# Patient Record
Sex: MALE | Race: WHITE | NOT HISPANIC OR LATINO | Employment: FULL TIME | URBAN - NONMETROPOLITAN AREA
[De-identification: names, ages, dates, MRNs, and addresses within clinical notes are randomized per-mention and may not be internally consistent; named-entity substitution may affect disease eponyms.]

---

## 2022-06-15 ENCOUNTER — OFFICE VISIT (OUTPATIENT)
Dept: URGENT CARE | Facility: CLINIC | Age: 37
End: 2022-06-15

## 2022-06-15 VITALS
RESPIRATION RATE: 16 BRPM | OXYGEN SATURATION: 97 % | SYSTOLIC BLOOD PRESSURE: 110 MMHG | DIASTOLIC BLOOD PRESSURE: 64 MMHG | BODY MASS INDEX: 22.87 KG/M2 | WEIGHT: 183.9 LBS | HEART RATE: 89 BPM | HEIGHT: 75 IN | TEMPERATURE: 98.6 F

## 2022-06-15 DIAGNOSIS — J02.0 PHARYNGITIS DUE TO STREPTOCOCCUS SPECIES: ICD-10-CM

## 2022-06-15 LAB
INT CON NEG: ABNORMAL
INT CON POS: ABNORMAL
S PYO AG THROAT QL: POSITIVE

## 2022-06-15 PROCEDURE — 87880 STREP A ASSAY W/OPTIC: CPT | Performed by: NURSE PRACTITIONER

## 2022-06-15 PROCEDURE — 99203 OFFICE O/P NEW LOW 30 MIN: CPT | Performed by: NURSE PRACTITIONER

## 2022-06-15 RX ORDER — DEXAMETHASONE SODIUM PHOSPHATE 10 MG/ML
10 INJECTION INTRAMUSCULAR; INTRAVENOUS ONCE
Status: COMPLETED | OUTPATIENT
Start: 2022-06-15 | End: 2022-06-15

## 2022-06-15 RX ORDER — AMOXICILLIN 500 MG/1
500 CAPSULE ORAL 2 TIMES DAILY
Qty: 20 CAPSULE | Refills: 0 | Status: SHIPPED | OUTPATIENT
Start: 2022-06-15 | End: 2022-06-25

## 2022-06-15 RX ORDER — IBUPROFEN 600 MG/1
600 TABLET ORAL EVERY 6 HOURS PRN
Qty: 30 TABLET | Refills: 0 | Status: SHIPPED | OUTPATIENT
Start: 2022-06-15

## 2022-06-15 RX ADMIN — DEXAMETHASONE SODIUM PHOSPHATE 10 MG: 10 INJECTION INTRAMUSCULAR; INTRAVENOUS at 11:37

## 2022-06-15 ASSESSMENT — ENCOUNTER SYMPTOMS
NAUSEA: 1
SORE THROAT: 1
WHEEZING: 0
COUGH: 0
DIARRHEA: 0
SWOLLEN GLANDS: 1
HEADACHES: 0
VOMITING: 1
FEVER: 1
SHORTNESS OF BREATH: 0

## 2022-06-15 NOTE — PATIENT INSTRUCTIONS
-Take antibiotic as directed.  -Oral Hydration.  -Warm salt water gargles.  -OTC Throat lozenges or spray (Cepacol).  -Tylenol and Motrin as directed for pain and fever.  -Hand Hygiene: Wash hands frequently with soap and water.  -Throw away toothbrush after 24 hrs on antibiotics, replace with new one.    Follow up for persistent throat pain, increased swelling, persistent fevers, difficulty swallowing, shortness of breath, weakness, elevated heart rate, or any other concerns.

## 2022-06-15 NOTE — PROGRESS NOTES
Subjective:     Hector Mera is a 37 y.o. male who presents for Pharyngitis (X 3 days; swollen lymph nodes, fever )      Symptoms started Sunday night. Temp of 38. Pain with swallowing. Has had multiple negative home COVID tests.     Pharyngitis   This is a new problem. The current episode started in the past 7 days. The problem has been gradually worsening. Neither side of throat is experiencing more pain than the other. The maximum temperature recorded prior to his arrival was 100.4 - 100.9 F. The pain is at a severity of 7/10. The pain is moderate. Associated symptoms include swollen glands and vomiting. Pertinent negatives include no coughing, diarrhea, drooling, ear pain, headaches or shortness of breath. He has tried NSAIDs for the symptoms.       History reviewed. No pertinent past medical history.    History reviewed. No pertinent surgical history.    Social History     Socioeconomic History   • Marital status:      Spouse name: Not on file   • Number of children: Not on file   • Years of education: Not on file   • Highest education level: Not on file   Occupational History   • Not on file   Tobacco Use   • Smoking status: Light Tobacco Smoker   • Smokeless tobacco: Never Used   Vaping Use   • Vaping Use: Never used   Substance and Sexual Activity   • Alcohol use: Yes   • Drug use: Never   • Sexual activity: Not on file   Other Topics Concern   • Not on file   Social History Narrative   • Not on file     Social Determinants of Health     Financial Resource Strain: Not on file   Food Insecurity: Not on file   Transportation Needs: Not on file   Physical Activity: Not on file   Stress: Not on file   Social Connections: Not on file   Intimate Partner Violence: Not on file   Housing Stability: Not on file        History reviewed. No pertinent family history.     Allergies   Allergen Reactions   • Clarithromycin        Review of Systems   Constitutional: Positive for fever and malaise/fatigue.  "  HENT: Positive for sore throat. Negative for drooling and ear pain.    Respiratory: Negative for cough, shortness of breath and wheezing.    Gastrointestinal: Positive for nausea and vomiting. Negative for diarrhea.   Neurological: Negative for headaches.   All other systems reviewed and are negative.       Objective:   /64 (BP Location: Right arm, Patient Position: Sitting)   Pulse 89   Temp 37 °C (98.6 °F) (Temporal)   Resp 16   Ht 1.905 m (6' 3\")   Wt 83.4 kg (183 lb 14.4 oz)   SpO2 97%   BMI 22.99 kg/m²     Physical Exam  Vitals reviewed.   Constitutional:       General: He is not in acute distress.     Appearance: He is well-developed. He is ill-appearing. He is not toxic-appearing.   HENT:      Head: Normocephalic and atraumatic.      Right Ear: Tympanic membrane, ear canal and external ear normal.      Left Ear: Tympanic membrane, ear canal and external ear normal.      Nose: Mucosal edema present.      Mouth/Throat:      Lips: Pink.      Mouth: Mucous membranes are moist.      Pharynx: Uvula midline. Posterior oropharyngeal erythema present.      Tonsils: Tonsillar exudate present. 3+ on the right. 3+ on the left.      Comments: Slightly muffled voice.   Eyes:      Conjunctiva/sclera: Conjunctivae normal.   Cardiovascular:      Rate and Rhythm: Normal rate.   Pulmonary:      Effort: Pulmonary effort is normal. No respiratory distress.      Breath sounds: Normal breath sounds. No stridor. No wheezing, rhonchi or rales.   Musculoskeletal:         General: Normal range of motion.      Cervical back: Normal range of motion and neck supple.   Lymphadenopathy:      Head:      Right side of head: Tonsillar adenopathy present.      Left side of head: Tonsillar adenopathy present.   Skin:     General: Skin is warm and dry.      Findings: No rash.   Neurological:      General: No focal deficit present.      Mental Status: He is alert and oriented to person, place, and time.      GCS: GCS eye subscore is " 4. GCS verbal subscore is 5. GCS motor subscore is 6.   Psychiatric:         Mood and Affect: Mood normal.         Speech: Speech normal.         Behavior: Behavior normal.         Thought Content: Thought content normal.         Judgment: Judgment normal.         Assessment/Plan:   1. Pharyngitis due to Streptococcus species  - POCT Rapid Strep A  - dexamethasone (DECADRON) injection (check route below) 10 mg  - ibuprofen (MOTRIN) 600 MG Tab; Take 1 Tablet by mouth every 6 hours as needed for Moderate Pain or Inflammation.  Dispense: 30 Tablet; Refill: 0  - amoxicillin (AMOXIL) 500 MG Cap; Take 1 Capsule by mouth 2 times a day for 10 days.  Dispense: 20 Capsule; Refill: 0    Results for orders placed or performed in visit on 06/15/22   POCT Rapid Strep A   Result Value Ref Range    Rapid Strep Screen Positive     Internal Control Positive Valid     Internal Control Negative Valid    -Take antibiotic as directed.  -Oral Hydration.  -Warm salt water gargles.  -OTC Throat lozenges or spray (Cepacol).  -Tylenol and Motrin as directed for pain and fever.  -Hand Hygiene: Wash hands frequently with soap and water.  -Throw away toothbrush after 24 hrs on antibiotics, replace with new one.    Follow up for persistent throat pain, increased swelling, persistent fevers, difficulty swallowing, shortness of breath, weakness, elevated heart rate, or any other concerns.     Stable vitals. Uniform tonsillar swelling bilaterally. No uvular deviation. Discussed S&S of PTA, and ER precautions: drooling, uvular deviation, unilateral swelling, increased in muffled voice.     Differential diagnosis, natural history, supportive care, and indications for immediate follow-up discussed.